# Patient Record
Sex: MALE | Race: BLACK OR AFRICAN AMERICAN | NOT HISPANIC OR LATINO | Employment: OTHER | URBAN - METROPOLITAN AREA
[De-identification: names, ages, dates, MRNs, and addresses within clinical notes are randomized per-mention and may not be internally consistent; named-entity substitution may affect disease eponyms.]

---

## 2017-10-12 ENCOUNTER — HOSPITAL ENCOUNTER (EMERGENCY)
Facility: HOSPITAL | Age: 62
Discharge: HOME | End: 2017-10-12
Attending: EMERGENCY MEDICINE
Payer: COMMERCIAL

## 2017-10-12 DIAGNOSIS — R55 SYNCOPE, UNSPECIFIED SYNCOPE TYPE: Primary | ICD-10-CM

## 2017-10-12 LAB
ALBUMIN SERPL BCP-MCNC: 2.7 G/DL
ALP SERPL-CCNC: 152 U/L
ALT SERPL W/O P-5'-P-CCNC: 22 U/L
ANION GAP SERPL CALC-SCNC: 14 MMOL/L
AST SERPL-CCNC: 33 U/L
BASOPHILS # BLD AUTO: 0.07 K/UL
BASOPHILS NFR BLD: 0.5 %
BILIRUB SERPL-MCNC: 0.9 MG/DL
BNP SERPL-MCNC: 112 PG/ML
BUN SERPL-MCNC: 8 MG/DL
CALCIUM SERPL-MCNC: 8.4 MG/DL
CHLORIDE SERPL-SCNC: 101 MMOL/L
CO2 SERPL-SCNC: 17 MMOL/L
CREAT SERPL-MCNC: 1.1 MG/DL
DIFFERENTIAL METHOD: ABNORMAL
EOSINOPHIL # BLD AUTO: 0.2 K/UL
EOSINOPHIL NFR BLD: 1.5 %
ERYTHROCYTE [DISTWIDTH] IN BLOOD BY AUTOMATED COUNT: 14 %
EST. GFR  (AFRICAN AMERICAN): >60 ML/MIN/1.73 M^2
EST. GFR  (NON AFRICAN AMERICAN): >60 ML/MIN/1.73 M^2
GLUCOSE SERPL-MCNC: 80 MG/DL
HCT VFR BLD AUTO: 38.7 %
HGB BLD-MCNC: 13.2 G/DL
LYMPHOCYTES # BLD AUTO: 4.3 K/UL
LYMPHOCYTES NFR BLD: 32.9 %
MCH RBC QN AUTO: 34.6 PG
MCHC RBC AUTO-ENTMCNC: 34.1 G/DL
MCV RBC AUTO: 101 FL
MONOCYTES # BLD AUTO: 1.2 K/UL
MONOCYTES NFR BLD: 9.4 %
NEUTROPHILS # BLD AUTO: 7.2 K/UL
NEUTROPHILS NFR BLD: 55.2 %
PLATELET # BLD AUTO: 257 K/UL
PMV BLD AUTO: 10.2 FL
POTASSIUM SERPL-SCNC: 4.6 MMOL/L
PROT SERPL-MCNC: 7.2 G/DL
RBC # BLD AUTO: 3.82 M/UL
SODIUM SERPL-SCNC: 132 MMOL/L
TROPONIN I SERPL DL<=0.01 NG/ML-MCNC: 0.02 NG/ML
TROPONIN I SERPL DL<=0.01 NG/ML-MCNC: 0.02 NG/ML
WBC # BLD AUTO: 13.02 K/UL

## 2017-10-12 PROCEDURE — 83880 ASSAY OF NATRIURETIC PEPTIDE: CPT

## 2017-10-12 PROCEDURE — 93005 ELECTROCARDIOGRAM TRACING: CPT

## 2017-10-12 PROCEDURE — 84484 ASSAY OF TROPONIN QUANT: CPT | Mod: 91

## 2017-10-12 PROCEDURE — 99284 EMERGENCY DEPT VISIT MOD MDM: CPT

## 2017-10-12 PROCEDURE — 80053 COMPREHEN METABOLIC PANEL: CPT

## 2017-10-12 PROCEDURE — 85025 COMPLETE CBC W/AUTO DIFF WBC: CPT

## 2017-10-12 RX ORDER — ESOMEPRAZOLE MAGNESIUM 40 MG/1
40 CAPSULE, DELAYED RELEASE ORAL
COMMUNITY

## 2017-10-12 RX ORDER — ATENOLOL 100 MG/1
100 TABLET ORAL DAILY
COMMUNITY

## 2017-10-12 RX ORDER — FERROUS SULFATE 325(65) MG
325 TABLET ORAL
COMMUNITY

## 2017-10-12 RX ORDER — HYDROCODONE BITARTRATE AND ACETAMINOPHEN 5; 325 MG/1; MG/1
1 TABLET ORAL EVERY 6 HOURS PRN
COMMUNITY

## 2017-10-12 RX ORDER — LEVOTHYROXINE SODIUM 137 UG/1
TABLET ORAL
COMMUNITY

## 2017-10-12 RX ORDER — ASPIRIN 81 MG/1
81 TABLET ORAL DAILY
COMMUNITY

## 2017-10-12 RX ORDER — ATORVASTATIN CALCIUM 10 MG/1
10 TABLET, FILM COATED ORAL DAILY
COMMUNITY

## 2017-10-12 RX ORDER — BENAZEPRIL HYDROCHLORIDE 20 MG/1
20 TABLET ORAL DAILY
COMMUNITY

## 2017-10-12 RX ORDER — FLUTICASONE PROPIONATE 50 MCG
2 SPRAY, SUSPENSION (ML) NASAL DAILY
COMMUNITY

## 2017-10-12 RX ORDER — CLONAZEPAM 0.5 MG/1
0.5 TABLET ORAL DAILY
COMMUNITY

## 2017-10-12 NOTE — ED NOTES
Pt presented to ED via EMS with CC of hypotension. Per report, pt's BP was 80s/50s in route. IV fluid infusing upon arrival. Pt AAO. Wife at bedside reports pt was exiting airplane when he asked staff to stop pushing his wheelchair because he suddenly began feeling hot and nauseous. Wife states that pt then had a syncopal episode which lasted several seconds. 911 was called and noted that pt was hypotensive. Pt reports hx of HTN and has had recent med changes, along with frequent diarrhea and poor appetite. Pt denies CP, SOB, dizziness, and vision changes at this time. Still hypotensive on arrival.     APPEARANCE: Alert, oriented and in no acute distress.  CARDIAC: Normal rate and rhythm.  PERIPHERAL VASCULAR: peripheral pulses present. Normal cap refill. No edema. Warm to touch.    RESPIRATORY:Normal rate and effort, breath sounds clear bilaterally throughout chest. Respirations are equal and unlabored no obvious signs of distress.  GASTRO: soft, no tenderness, no abdominal distention.  MUSC: Full ROM. No bony tenderness or soft tissue tenderness. R AKA.  SKIN: Skin is cool and dry, normal skin turgor, mucous membranes moist.  NEURO: 5/5 strength major flexors/extensors bilaterally. Ifrah coma scale: eyes open spontaneously-4, oriented & converses-5, obeys commands-6. No neurological abnormalities.   MENTAL STATUS: awake, alert and aware of environment.

## 2017-10-12 NOTE — ED PROVIDER NOTES
Encounter Date: 10/12/2017       History     Chief Complaint   Patient presents with    Hypotension     Manolo Montenegro is a 61 y.o. M who  has a past medical history of Hypertension; MI (myocardial infarction); and Pneumonia.    The patient presents to the ED due to syncope. Patient currently wheelchair bound due to R AKA and L drop-foot.  Patient reports he just flew into town to attend his mother's .  Family states patient was being wheeled from the plane being lifted into another chair, when he suddenly became diaphoretic and lost consciousness.  Patient states he felt warm and slightly lightheaded but does not remember anything else.  Family reports he was unconscious for only a few seconds, and he suddenly regained consciousness.  Patient denies any preceding chest pain, shortness of breath, nausea/vomiting.    EMS reports patient was initially cool and clammy to the touch.  An IV was started and he was given normal saline in route.  On arrival, patient awake/alert/oriented, and denies any acute complaints at this time.          Review of patient's allergies indicates:  No Known Allergies  Past Medical History:   Diagnosis Date    Foot drop, left     Hypertension     MI (myocardial infarction)     Pneumonia      Past Surgical History:   Procedure Laterality Date    CARDIAC DEFIBRILLATOR PLACEMENT      CARDIAC PACEMAKER PLACEMENT      feeding tube placement and removal      LEG AMPUTATION THROUGH KNEE Right     TRACHEOSTOMY CLOSURE      TRACHEOSTOMY TUBE PLACEMENT      vascular stent Left     stent placed to L wrist to restore blood flow to L hand     History reviewed. No pertinent family history.  Social History   Substance Use Topics    Smoking status: Current Every Day Smoker     Packs/day: 0.50     Types: Cigarettes    Smokeless tobacco: Never Used    Alcohol use Yes      Comment: occasionally     Review of Systems   Constitutional: Positive for fatigue. Negative for chills and fever.    HENT: Negative for sore throat.    Respiratory: Negative for shortness of breath and wheezing.    Cardiovascular: Negative for chest pain.   Gastrointestinal: Negative for constipation, diarrhea, nausea and vomiting.   Genitourinary: Negative for dysuria, frequency and urgency.   Musculoskeletal: Negative for back pain.   Skin: Negative for rash and wound.   Neurological: Positive for syncope and light-headedness. Negative for dizziness, weakness and headaches.   Hematological: Does not bruise/bleed easily.   Psychiatric/Behavioral: Negative for agitation, behavioral problems and confusion.       Physical Exam     Initial Vitals   BP Pulse Resp Temp SpO2   10/12/17 1800 -- 10/12/17 1753 -- --   (!) 88/57  20        MAP       10/12/17 1800       67.33         Physical Exam    Nursing note and vitals reviewed.  Constitutional: He appears well-developed and well-nourished. He is not diaphoretic. No distress.   HENT:   Head: Normocephalic and atraumatic.   Mouth/Throat: Oropharynx is clear and moist.   Eyes: EOM are normal. Pupils are equal, round, and reactive to light.   Neck: No tracheal deviation present.   Scar from prior tracheostomy, well-healed   Cardiovascular: Normal rate, regular rhythm, normal heart sounds and intact distal pulses.   Pulmonary/Chest: Breath sounds normal. No stridor. No respiratory distress.   Abdominal: Soft. He exhibits no distension and no mass. There is no tenderness.   Scar from prior PEG tube, well-healed   Musculoskeletal: Normal range of motion. He exhibits no edema.        Legs:  Right AKA  Left foot in brace due to foot drop   Neurological: He is alert and oriented to person, place, and time. No cranial nerve deficit or sensory deficit.   Skin: Skin is warm and dry. Capillary refill takes less than 2 seconds. No rash noted.   Psychiatric: He has a normal mood and affect. His behavior is normal. Thought content normal.         ED Course   Procedures  Labs Reviewed   CBC W/ AUTO  DIFFERENTIAL - Abnormal; Notable for the following:        Result Value    WBC 13.02 (*)     RBC 3.82 (*)     Hemoglobin 13.2 (*)     Hematocrit 38.7 (*)      (*)     MCH 34.6 (*)     Mono # 1.2 (*)     All other components within normal limits   COMPREHENSIVE METABOLIC PANEL - Abnormal; Notable for the following:     Sodium 132 (*)     CO2 17 (*)     Calcium 8.4 (*)     Albumin 2.7 (*)     Alkaline Phosphatase 152 (*)     All other components within normal limits   B-TYPE NATRIURETIC PEPTIDE - Abnormal; Notable for the following:      (*)     All other components within normal limits   TROPONIN I   TROPONIN I     EKG Readings: (Independently Interpreted)   Initial Reading: No STEMI.   Normal sinus rhythm, prolonged NC interval 256, T-wave flattening and inverted T in lead I and aVL  No prior for comparison     Imaging Results          X-Ray Chest AP Portable (Final result)  Result time 10/12/17 19:09:44    Final result by Gary Bonilla MD (10/12/17 19:09:44)                 Impression:        Cardiac device without radiographic acute intrathoracic process seen on this single view.      Electronically signed by: GARY BONILLA MD, MD  Date:     10/12/17  Time:    19:09              Narrative:    COMPARISON: None    FINDINGS: AP portable  view of the chest. Monitoring leads overlie the chest. Left upper chest dual-lead cardiac device in place. Cardiac silhouette is upper limits of normal in size without evidence of failure. Calcific atherosclerosis of the thoracic aorta. Mediastinal contours are otherwise within normal limits. Biapical pleural-parenchymal scarring. Scattered linear opacities consistent with subsegmental scarring versus atelectasis. The lungs are otherwise symmetrically normally inflated without large consolidation. No pleural effusion or pneumothorax.No acute osseous process seen.   PA and lateral views can be obtained.                                 Medical Decision Making:   Initial  Assessment:   61-year-old male with history of hypertension, defibrillator placement [place] at the airport [place].  Family reports patient was being lifted and transferred from wheelchair shortly before symptoms started.  Will obtain cardiac evaluation including EKG, chest x-ray, and delta troponin.  Differential Diagnosis:   Differential Diagnosis includes, but is not limited to:  Arrhythmia, aortic dissection, MI/unstable angina, PE, cardiogenic shock, CHF, CVA/TIA, intracranial lesion/mass, seizure, perforated viscous, ruptured AAA, orthostatic hypotension, vasovagal episode, anemia, dehydration, medication reaction, intentional overdose    Independently Interpreted Test(s):   I have ordered and independently interpreted X-rays - see prior notes.  I have ordered and independently interpreted EKG Reading(s) - see prior notes  Clinical Tests:   Lab Tests: Ordered and Reviewed  Radiological Study: Ordered and Reviewed  Medical Tests: Reviewed and Ordered  ED Management:  EKG with T-wave inversions lateral leads without reciprocal changes.  Chest x-ray clear without evidence of fluid overload or acute infiltrate.  Initial troponin negative. Labs otherwise non-diagnostic.  Delta troponin negative as well.  On reassessment, patient well-appearing and asymptomatic. Denies any chest pain/SOB at this time. Patient and family updated on findings. Admission for overnight observation offered as patient with multiple risk factors, however, patient and family are more comfortable with discharge at this time. Counseled extensively on concerning signs/symptoms and to return to the ER for any further concerns. Patient and family agreed with plan of care.  Upon re-evaluation, the patient's status has improved.    After complete ED evaluation, clinical impression is most consistent with syncope.    At this time, I believe the patient is stable for discharge from the ED. The patient and any additional  family present were updated with test results, overall impression, and further plan of care. All questions answered. Patient expressed understanding and agreed with current plan for discharge and PCP follow-up within 1 week. Strict return precautions were provided, including chest pain, SOB, return of syncope, worsening of current symptoms or any other concerns.                      ED Course      Clinical Impression:   The encounter diagnosis was Syncope, unspecified syncope type.                           Yovani Arroyo MD  10/19/17 0729

## 2017-10-13 VITALS
TEMPERATURE: 98 F | DIASTOLIC BLOOD PRESSURE: 68 MMHG | OXYGEN SATURATION: 100 % | BODY MASS INDEX: 21.7 KG/M2 | WEIGHT: 155 LBS | HEART RATE: 67 BPM | RESPIRATION RATE: 18 BRPM | HEIGHT: 71 IN | SYSTOLIC BLOOD PRESSURE: 100 MMHG

## 2017-10-17 DIAGNOSIS — R55 SYNCOPE: Primary | ICD-10-CM
